# Patient Record
Sex: MALE | Race: WHITE
[De-identification: names, ages, dates, MRNs, and addresses within clinical notes are randomized per-mention and may not be internally consistent; named-entity substitution may affect disease eponyms.]

---

## 2019-11-20 ENCOUNTER — HOSPITAL ENCOUNTER (OUTPATIENT)
Dept: HOSPITAL 84 - D.HCCARDIO | Age: 79
Discharge: HOME | End: 2019-11-20
Attending: INTERNAL MEDICINE
Payer: MEDICARE

## 2019-11-20 VITALS — BODY MASS INDEX: 28.6 KG/M2

## 2019-11-20 DIAGNOSIS — R94.31: Primary | ICD-10-CM

## 2019-12-16 ENCOUNTER — HOSPITAL ENCOUNTER (OUTPATIENT)
Dept: HOSPITAL 84 - D.CATH | Age: 79
Discharge: HOME | End: 2019-12-16
Attending: INTERNAL MEDICINE
Payer: MEDICARE

## 2019-12-16 VITALS — SYSTOLIC BLOOD PRESSURE: 174 MMHG | DIASTOLIC BLOOD PRESSURE: 98 MMHG

## 2019-12-16 VITALS — WEIGHT: 220.46 LBS | HEIGHT: 70 IN | BODY MASS INDEX: 31.56 KG/M2

## 2019-12-16 VITALS — DIASTOLIC BLOOD PRESSURE: 121 MMHG | SYSTOLIC BLOOD PRESSURE: 217 MMHG

## 2019-12-16 DIAGNOSIS — Z72.0: ICD-10-CM

## 2019-12-16 DIAGNOSIS — R94.30: Primary | ICD-10-CM

## 2019-12-16 DIAGNOSIS — I10: ICD-10-CM

## 2019-12-16 DIAGNOSIS — R94.31: ICD-10-CM

## 2019-12-16 LAB
ALT SERPL-CCNC: 32 U/L (ref 10–68)
ANION GAP SERPL CALC-SCNC: 11 MMOL/L (ref 8–16)
BASOPHILS NFR BLD AUTO: 0 % (ref 0–2)
BUN SERPL-MCNC: 20 MG/DL (ref 7–18)
CALCIUM SERPL-MCNC: 9.3 MG/DL (ref 8.5–10.1)
CHLORIDE SERPL-SCNC: 105 MMOL/L (ref 98–107)
CHOLEST/HDLC SERPL: 2.2 RATIO (ref 2.3–4.9)
CO2 SERPL-SCNC: 30 MMOL/L (ref 21–32)
CREAT SERPL-MCNC: 1.2 MG/DL (ref 0.6–1.3)
EOSINOPHIL NFR BLD: 1.3 % (ref 0–7)
ERYTHROCYTE [DISTWIDTH] IN BLOOD BY AUTOMATED COUNT: 13.8 % (ref 11.5–14.5)
GLUCOSE SERPL-MCNC: 103 MG/DL (ref 74–106)
HCT VFR BLD CALC: 51.6 % (ref 42–54)
HDLC SERPL-MCNC: 66 MG/DL (ref 32–96)
HGB BLD-MCNC: 17.9 G/DL (ref 13.5–17.5)
IMM GRANULOCYTES NFR BLD: 0.1 % (ref 0–5)
LDL-HDL RATIO: 0.9 RATIO (ref 1.5–3.5)
LDLC SERPL-MCNC: 61 MG/DL (ref 0–100)
LYMPHOCYTES NFR BLD AUTO: 16.5 % (ref 15–50)
MCH RBC QN AUTO: 31.4 PG (ref 26–34)
MCHC RBC AUTO-ENTMCNC: 34.7 G/DL (ref 31–37)
MCV RBC: 90.5 FL (ref 80–100)
MONOCYTES NFR BLD: 6.5 % (ref 2–11)
NEUTROPHILS NFR BLD AUTO: 75.6 % (ref 40–80)
OSMOLALITY SERPL CALC.SUM OF ELEC: 285 MOSM/KG (ref 275–300)
PLATELET # BLD: 141 10X3/UL (ref 130–400)
PMV BLD AUTO: 11.4 FL (ref 7.4–10.4)
POTASSIUM SERPL-SCNC: 4 MMOL/L (ref 3.5–5.1)
RBC # BLD AUTO: 5.7 10X6/UL (ref 4.2–6.1)
SODIUM SERPL-SCNC: 142 MMOL/L (ref 136–145)
TRIGL SERPL-MCNC: 90 MG/DL (ref 30–200)
WBC # BLD AUTO: 7.5 10X3/UL (ref 4.8–10.8)

## 2019-12-16 NOTE — NUR
PT RESTING W/O COMPLAINTS.  Z BAND AND IMMOBILIZER IN PLACE, DRESSING
CDI NO BLEEDING OR S/S HEMATOMA NOTED.  VSS.  CALL LIGHT IN REACH.
3CC AIR REMOVED FROM Z BAND, NO BLEEDING NOTED.

## 2019-12-16 NOTE — NUR
PT RESTING COMFORTABLY.  DENIES PAIN OR NEEDS.  TOLERATING PO FLUIDS.
Z BAND AND IMMOBILIZER IN PLACE, DRESSING CDI NO BLEEDING OR S/S
HEMATOMA NOTED.  HR 69, /81, SAT 92 ON 2L/NC.  CALL LIGHT IN
REACH, FAMILY AT BS.

## 2019-12-16 NOTE — NUR
3 ADD'L CC REMOVED FROM Z BAND, NO BLEEDING OR S/S HEMATOMA NOTED. PT
DENIES DISCOMFORT OR NEEDS AT THIS TIME.  CALL LIGHT IN REACH, WIFE
AT  BEDSIDE

## 2019-12-16 NOTE — NUR
PT RECEIVED VIA STRECTHER FROM CATH LAB FOR RECOVERY. PT AWAKE AND
ALERT, DENIES PAIN OR DISCOMFORT.  ZYPHER BAND AND IMMOBILIZER TO R
WRIST, DRESSING CDI NO BLEEDING OR S/S HEMATOMA NOTED.  ARM PINK AND
WARM, CAP REFILL BRISK.  PT PLACED ON CARDIAC MONITORS, HR NSR RATE
72, /81, SAT 90.  IV PATENT INFUSING VIA ORDERS.  CALL LIGHT IN
REACH.  DR CURRAN AT  DISCUSSING PLAN OF CARE AND PROCEDURE RESULTS
TO PT AND FAMILY.

## 2019-12-16 NOTE — NUR
PT AMBULATED TO BR, VOIDING W/O DIFFICULITY.  BACK TO ROOM, ZBAND AND
REMAINING AIR REMOVED W/O BLEEDING OR S/S HEMATOMA NOTED.  2X2 AND
TEGADERM DRESSING APPLIED.
1540 PT DISCHARGED VIA WC TO FAMILY WAITING IN PRIVATE VEHICLE.  PT
HAD ALL BELONGINGS AND DISCHARGE INFORMATION

## 2019-12-16 NOTE — NUR
DISCHARGE INSTRUCTIONS REVIEWED W PT AND WIFE, HE VERBALIZED
UNDERSTANDING. IV REMOVED W CATH INTACT, MONITORS REMOVED AND PT UP
TO DRESS FOR DISCHARGE.